# Patient Record
Sex: FEMALE | Race: WHITE | NOT HISPANIC OR LATINO | Employment: FULL TIME | URBAN - METROPOLITAN AREA
[De-identification: names, ages, dates, MRNs, and addresses within clinical notes are randomized per-mention and may not be internally consistent; named-entity substitution may affect disease eponyms.]

---

## 2023-01-17 ENCOUNTER — MEDICAL CORRESPONDENCE (OUTPATIENT)
Dept: HEALTH INFORMATION MANAGEMENT | Facility: CLINIC | Age: 38
End: 2023-01-17

## 2024-01-04 ENCOUNTER — MEDICAL CORRESPONDENCE (OUTPATIENT)
Dept: HEALTH INFORMATION MANAGEMENT | Facility: CLINIC | Age: 39
End: 2024-01-04
Payer: COMMERCIAL

## 2024-01-12 ENCOUNTER — TRANSCRIBE ORDERS (OUTPATIENT)
Dept: OTHER | Age: 39
End: 2024-01-12

## 2024-01-12 DIAGNOSIS — D68.01 VON WILLEBRAND DISEASE, TYPE 1 (H): Primary | ICD-10-CM

## 2024-03-04 ENCOUNTER — VIRTUAL VISIT (OUTPATIENT)
Dept: HEMATOLOGY | Facility: CLINIC | Age: 39
End: 2024-03-04
Attending: INTERNAL MEDICINE
Payer: COMMERCIAL

## 2024-03-04 DIAGNOSIS — D68.01 VON WILLEBRAND DISEASE, TYPE 1 (H): ICD-10-CM

## 2024-03-04 PROCEDURE — 99205 OFFICE O/P NEW HI 60 MIN: CPT | Mod: 95 | Performed by: INTERNAL MEDICINE

## 2024-03-04 NOTE — PROGRESS NOTES
Patient was contacted to complete the pre-visit call prior to their telephone visit with the provider.     Allergies and medications were reviewed.     I thanked them for their time to cover this information.     Starla Linares MA

## 2024-03-18 NOTE — PROGRESS NOTES
[facility]  [facility]  [facility] [address], [address], [place] [address]  Main: [phone], Fax: [phone]          Outpatient Clinic Visit  Date:  03/04/2024    Zonia Torres is a 38-year-old woman with von Willebrand disease, previously followed at the [facility] in [place], [place].  She was last seen there in August 2021, and recommended to have a return visit in 3 years.  She is transitioning her care here now, as her previous provider is no longer at the clinic.    She was diagnosed with type I von Willebrand disease in 2017.  Her main issue has been heavy menstrual bleeding.  She did have excessive bleeding following her first vaginal delivery (details unclear) and reports daily bleeding for about 6 weeks but then eventually needed a D&C secondary to retained placenta.  At time of diagnosis in 2017 her baseline von Willebrand factor antigen was 39%, ristocetin cofactor activity 39%, and factor VIII activity 51%.  She had an IV DDAVP challenge in August 2021, with the following results:     Baseline 1 hour post DDAVP 4 hours post DDAVP   VWF antigen 44 152 113   VWF activity (ristocetin) 35 189 118   Factor VIII 61 226 161     Over the last 3 years, she has had no new bleeding issues.  She denies any difficulty with epistaxis or unusual bruising.  She continues to have heavy menstrual bleeding although this has improved since she has had children.  She reports that her periods last 8 to 10 days, and for 2 days in the middle she has to change pads every hour.  She reports however that she is managing okay without any specific treatment.  She was told she could not have tranexamic acid due to thrombotic risk (see below).  Her most recent hemoglobin in October 2023 was 15.1.  Most recent ferritin value was from August 2021 at which time it was 31.  She has no history of iron deficiency nor treatment with iron.    Pregnancy history is as follows:  1.   Miscarriage at 9 weeks (2011)  2.  Vaginal delivery (2012, female)  3.  Miscarriage at 6 weeks (2014)  4.   delivery (2016, male)  5.   delivery (2019, female)    In 2018, during her last pregnancy, she was tested for thrombophilia apparently because she presented with a subchorionic bleed.  She was treated at that time with a single dose of Alphanate.  Thrombophilia testing showed low protein S, and homozygous 4G/4G YULI-1 variant.  No other genetic or acquired thrombophilia was found.    At that time (2018), test results were as follows:  Total protein S antigen --100 (normal range )  Free protein S -- 47 (normal range )  Functional protein S --61 (normal range )    The low protein S levels were attributed to the hormonal effects of pregnancy.  However repeat testing was performed in 2021.  At that time she was not on estrogen.  Free protein S -- 65 (normal range )  Functional protein S -- 52 (normal range )    With this repeat testing again showing low protein S levels, she was felt to be at increased risk for thrombosis, and thus treatment with DDAVP and antifibrinolytics was discouraged.  Part of the concern also related to her previous testing showing the homozygous 4G/4G YULI-1 variant.    Note, however, that she has no personal history of venous thrombosis and there also is no known family history of venous thrombosis.  She has a fairly large extended family with 1 aunt and 1 uncle and 8 first cousins on her maternal side, and 5 aunts and 13 first cousins on her paternal side.    Physical exam:  Not done, video visit.    Labs: No new labs were obtained as part of today's visit.      ASSESSMENT / RECOMMENDATIONS:  1.  Von Willebrand disease (mild type 1)  2.  Low protein S    Zonia's primary issue related to her diagnosis of von Willebrand disease is heavy menstrual bleeding.  She was previously told she could not be treated  with DDAVP or antifibrinolytics because of thrombotic risk related to her low protein S level and homozygous 4G/4G YULI-1 variant.  Current consensus is that the YULI-1 variant is of no clinical significance in this regard.      Although protein S deficiency is a legitimate risk factor for venous thrombosis, the first time she was tested was during pregnancy, with lower levels to be expected due to the hormonal changes of pregnancy.  However, when she was retested in  her free protein S level (felt to be the most clinically relevant) was borderline.  That said, she has no personal history of venous thrombosis in spite of 3 full-term pregnancies (2 of which were delivered by ).  She did not receive pharmacologic VTE prophylaxis during any of these pregnancies.  In addition, she has a large extended family and there is no history of venous thromboembolism.  Thus, although she seems have a borderline free protein S level, her personal and family history does not suggest the presence of a genetic thrombophilia.  Thus, from our perspective, treatment with antifibrinolytic agents to manage heavy menstrual bleeding would be reasonable.  Will also coordinate a repeat free protein S level to be checked locally.    Total time on date of encounter 60 minutes, including review of medical records and labs, video visit, and documentation.      Wily Morgan MD  Professor of Medicine  Division of Hematology, Oncology, and Transplantation  Director, [facility]      ADDENDUM:  Follow-up labs were obtained in the [facility] system on 2024.    CBC was normal, including hemoglobin 13.2.  Ferritin was 33.    Von Willebrand factor antigen -- 46% (normal )  Von Willebrand factor activity -- 33% (normal )  Factor VIII activity-- 55% (normal )    Free protein S was 73% (normal range )      These labs are consistent with her established diagnosis of mild type I von  Willebrand disease.  She does NOT appear to have protein S deficiency, which is consistent with her personal and family history as outlined above.  Thus, there is no contraindication to treating her with DDAVP or antifibrinolytic agents if needed.      Wily Morgan MD  Professor of Medicine  Division of Hematology, Oncology, and Transplantation  Director, [facility]

## 2024-03-19 ENCOUNTER — TELEPHONE (OUTPATIENT)
Dept: HEMATOLOGY | Facility: CLINIC | Age: 39
End: 2024-03-19
Payer: COMMERCIAL

## 2024-03-19 DIAGNOSIS — D68.01 VON WILLEBRAND DISEASE, TYPE 1 (H): Primary | ICD-10-CM

## 2024-03-19 NOTE — TELEPHONE ENCOUNTER
[id number]  Zonia Torres  38 year old female  [facility] Diagnosis: vW type 1  [facility] Provider: Dr. Morgan     Call placed to Zonia to let her know that Dr. Morgan would like to get some additional lab work done.     Orders have been faxed to [facility] (#[phone]). Zonia will be in [place] around April 22nd and would like to have them drawn then. Zonia is aware that [facility] staff will reach out once those results are received.    Email sent to Dianne WRIGHT at [facility] to let her know this plan and to look for the orders that were faxed today.    Noris Olmos  BSN, RN, PHN   [facility]   Office: [phone]  Fax: [phone]

## 2024-05-02 ENCOUNTER — TELEPHONE (OUTPATIENT)
Dept: HEMATOLOGY | Facility: CLINIC | Age: 39
End: 2024-05-02
Payer: COMMERCIAL

## 2024-05-02 DIAGNOSIS — D68.01 VON WILLEBRAND DISEASE, TYPE 1 (H): Primary | ICD-10-CM

## 2024-05-03 RX ORDER — TRANEXAMIC ACID 650 MG/1
1300 TABLET ORAL 2 TIMES DAILY
Qty: 60 TABLET | Refills: 3 | Status: SHIPPED | OUTPATIENT
Start: 2024-05-03

## 2024-05-03 NOTE — TELEPHONE ENCOUNTER
[id number]  Zonia Torres  38 year old female  [facility] Diagnosis: mild type 1 vW  [facility] Provider: Dr. Morgan    Call placed to Zonia to discuss her recent lab results collected at [facility].    Per Dr. Morgan, Zonia's labs are consistent with mild type 1 vW. She does not have protein S deficiency and therefore there is no contraindication to DDAVP or antifibrinolytics as needed.    Zonia thanked staff for this information. Staff explained that Zonia can continue to follow with the [facility] or if it is more convenient, she can be followed at [facility]. Zonia was recently at a vW conference in [place] where she spoke to people about her condition and recent labs. She is now wondering if she can get IV iron as her ferritin is low.    Plan made to review the question of IV iron with Dr. Morgan. Zonia will consider where she wants to receive care.    Noris TATE, RN, N   [facility]   Office: [phone]  Fax: [phone]     Addendum 5/3/24    Call back to Zonia to let her know that Dr. Morgan is in support of her getting IV iron but insurance may not cover it based on her current labs.    Staff reviewed options with Zonia and that if we order the IV iron, she will need to get that at a [facility] infusion center. Zonia states that she would like to keep her vW care with the [facility] but she will ask her PCP if they can order IV iron for her. Zonia is interested in having TXA available during her periods. She'd like this Rx sent to [facility].    Noris TATE, RN, [facility]   Office: [phone]  Fax: [phone]

## 2024-07-24 NOTE — PROGRESS NOTES
[facility]  [facility] for [facility]otting [facility]  [address] [place] [address], [address], [place] [address]  Main: [phone], Fax: [phone]      Outpatient Visit Note:    Patient: Zonia Torres  MRN: [id number]  : 1985  TACO: 2024    Reason for Visit:  Zonia Torres is a 38 year old female with type I von Willebrand disease that presents today for follow-up.    Summary of Pertinent Clinical History:  Zonia was previously followed at the [facility] in [place], [place]. Her first visit at our [facility] was with Dr. Morgan on 2024. Please refer to that note for extensive details regarding her history, which is summarized below.    She was diagnosed with type I von Willebrand disease in . BL levels at that time: von Willebrand factor antigen was 39%, ristocetin cofactor activity 39%, factor VIII 51%. Her primary historical bleeding diathesis has been menorrhagia. She has been was on combined oral contraceptives between the ages of [age], but other than that, has not had much medical intervention for her menorrhagia. She has been intermittently iron deficient in the past.     Denies a significant history of epistaxis, gingival bleeding, hematuria, hematchezia, or melena.    She reports a history of excessive bleeding (daily bleeding x~6 weeks) after her first vaginal delivery. She eventually was found to have some retained placenta and was managed with D&C.   Pregnancy history is as follows:  1.  Miscarriage at 9 weeks (2011)  2.  Vaginal delivery (2012, female)  3.  Miscarriage at 6 weeks (2014)  4.   delivery (2016, male)  5.   delivery (2019, female)    Other past procedures include lap ovarian cystectomy, excision of endometrial polyp, jaw surgery, wisdom tooth extractions.    Of note, she was formerly thought to have protein S deficiency, but this was incorrectly applied (see Dr. Morgan's note  from     Interval History:  At her last visit with Dr. Morgan, she was prescribed TXA for menstrual management.   Incidentally, she notes that her last two periods have been much lighter, thus she has not felt the need to take it. She notes that they were 4 days or less in length, and that she was changing period products no more than every couple hours when her flow was at its heaviest. She denies any other bleeding concerns at this time. She has no upcoming surgeries or procedures planned.    She notes that her son (currently age 8) had his von Willebrand levels checked at age 5 and they were normal, however, he has been having frequent episodes of epistaxis and easy bruising.    ROS:  Denies epistaxis, gingival bleeding, excessive bruising/ecchymosis, hematuria, hematochezia, and melena.     Exam via Televideo:  Constitutional: Appears well. Not in distress.   Respiratory: Breathing comfortably on room air.  Neuro: Aox3.    Labs:  DDAVP Trial in August 2021    Baseline 1 hour post DDAVP 4 hours post DDAVP   VWF antigen 44 152 113   VWF activity (ristocetin) 35 189 118   Factor VIII 61 226 161     April 2024 ([place])  Von Willebrand factor antigen -- 46% (normal )  Von Willebrand factor activity -- 33% (normal )  Factor VIII activity-- 55% (normal )  Free protein S was 73% (normal range )    Assessment/Plan:  #Type I von Willebrand disease (April 2024: von Willebrand disease antigen 46%, von Willebrand factor activity 33%, factor VIII 55%).  Her primarily historical bleeding diathesis has been menorrhagia. This is currently not an issue despite no interventions. She has TXA on hand and will try it in the event that she has a heavier period. I encouraged her to stay in touch with us should her bleeding patterns change, or in the event that she needs a surgery/procedure, including invasive dental work. She responds well to DDAVP, so we could consider compounded nasal or IV DDAVP for  management PRN in the future. Recommend iron studies q4-6 monts for maintenance. Will send standing orders to [facility].       Video Visit  Patient location: Home.  Provider location: [facility].  Time on call 17min 8sec.      30 minutes spent on the date of the encounter doing chart review, history and exam, documentation and further activities per the note.           Zaynab Good PA-C, [facility]  [facility]  [address] [place] [address], [address], [place] [address]  Main: [phone], Fax: [phone]

## 2024-07-25 ENCOUNTER — VIRTUAL VISIT (OUTPATIENT)
Dept: HEMATOLOGY | Facility: CLINIC | Age: 39
End: 2024-07-25
Attending: PHYSICIAN ASSISTANT
Payer: COMMERCIAL

## 2024-07-25 DIAGNOSIS — E61.1 IRON DEFICIENCY: ICD-10-CM

## 2024-07-25 DIAGNOSIS — D68.01 VON WILLEBRAND DISEASE, TYPE 1 (H): ICD-10-CM

## 2024-07-25 PROCEDURE — 99214 OFFICE O/P EST MOD 30 MIN: CPT | Mod: 95 | Performed by: PHYSICIAN ASSISTANT

## 2024-07-25 NOTE — PROGRESS NOTES
Lab orders faxed to [facility] # [phone]    Noris ARREAGAN, RN, PHN   [facility]   Office: [phone]  Fax: [phone]

## 2024-12-15 ENCOUNTER — HEALTH MAINTENANCE LETTER (OUTPATIENT)
Age: 39
End: 2024-12-15

## 2025-08-31 ENCOUNTER — HEALTH MAINTENANCE LETTER (OUTPATIENT)
Age: 40
End: 2025-08-31